# Patient Record
Sex: FEMALE | Race: WHITE | NOT HISPANIC OR LATINO
[De-identification: names, ages, dates, MRNs, and addresses within clinical notes are randomized per-mention and may not be internally consistent; named-entity substitution may affect disease eponyms.]

---

## 2022-08-10 ENCOUNTER — APPOINTMENT (OUTPATIENT)
Dept: PULMONOLOGY | Facility: CLINIC | Age: 62
End: 2022-08-10

## 2022-08-10 VITALS
BODY MASS INDEX: 35.85 KG/M2 | TEMPERATURE: 97 F | DIASTOLIC BLOOD PRESSURE: 80 MMHG | HEART RATE: 82 BPM | HEIGHT: 64 IN | OXYGEN SATURATION: 98 % | SYSTOLIC BLOOD PRESSURE: 132 MMHG | WEIGHT: 210 LBS

## 2022-08-10 DIAGNOSIS — J30.2 OTHER SEASONAL ALLERGIC RHINITIS: ICD-10-CM

## 2022-08-10 DIAGNOSIS — M19.90 UNSPECIFIED OSTEOARTHRITIS, UNSPECIFIED SITE: ICD-10-CM

## 2022-08-10 DIAGNOSIS — Z87.891 PERSONAL HISTORY OF NICOTINE DEPENDENCE: ICD-10-CM

## 2022-08-10 PROCEDURE — 99203 OFFICE O/P NEW LOW 30 MIN: CPT

## 2022-08-10 RX ORDER — LEVALBUTEROL HYDROCHLORIDE 1.25 MG/3ML
1.25 SOLUTION RESPIRATORY (INHALATION)
Refills: 0 | Status: COMPLETED | COMMUNITY
Start: 2022-07-07 | End: 2022-08-10

## 2022-08-11 PROBLEM — M19.90 OSTEOARTHRITIS: Status: RESOLVED | Noted: 2022-08-11 | Resolved: 2022-08-11

## 2022-08-11 PROBLEM — Z87.891 FORMER CIGARETTE SMOKER: Status: ACTIVE | Noted: 2022-08-11

## 2022-08-11 PROBLEM — J30.2 SEASONAL ALLERGIES: Status: RESOLVED | Noted: 2022-08-11 | Resolved: 2022-08-11

## 2022-08-11 NOTE — PHYSICAL EXAM
[No Acute Distress] : no acute distress [Normal Appearance] : normal appearance [No Neck Mass] : no neck mass [Normal Rate/Rhythm] : normal rate/rhythm [Normal S1, S2] : normal s1, s2 [No Murmurs] : no murmurs [No Resp Distress] : no resp distress [Clear to Auscultation Bilaterally] : clear to auscultation bilaterally [No Abnormalities] : no abnormalities [No Clubbing] : no clubbing [No Edema] : no edema [No Focal Deficits] : no focal deficits [Oriented x3] : oriented x3 [Normal Affect] : normal affect [TextBox_68] : No wheezes/rhonchi

## 2022-08-11 NOTE — DISCUSSION/SUMMARY
[FreeTextEntry1] : 62F remote smoker here to establish care for work-up of shortness of breath\par \par #Shortness of breath\par - Episodic, associated with allergic symptoms; most likely 2/2 asthma\par - Will check full PFTs with [facility]\par - Given Pt's frequent use of Fior, I have provided extensive education re: asthma management with maintenance inhaler regimen and potential need for biologics pending further work-up\par - To start daily symbicort (Pt still has inhaler at home) and educated on 2 puffs BID use with mouth-rinsing after each use\par \par Follow-up in 1 month for PFTs and symptom management

## 2022-08-11 NOTE — HISTORY OF PRESENT ILLNESS
[Former] : former [>= 20 pack years] : >= 20 pack years [Never] : never [TextBox_4] : 62F who presents to [facility]. She was previously following with Dr. Saab's office for presumed COPD vs asthma. She was scheduled to have PFTs for further work-up at his office but never had it done due to COVID. Last study done in 2003 which was normal per patient. \par \par In the past, Pt underwent elective surgeries for her knee/back. During the operations, she was told she woke up in the middle of anesthesia and had acute aspiration events causing bronchospasm. She was told she might have COPD and so was following with Dr. Saab for further work-up. He started her on symbicort which she took on PRN basis and has since stopped. In the past year, she has only been taking xopenex nebs and albuterol inhaler PRN. Additionally, she has had frequent exacerbations of her breathing especially during spring & christiano seasons requiring prednisone use. Most recently she had COVID 7/2022 but did not need hospitalization (she is vaccinated). Since then she has noted some nasal congestion persist. She previously had a chronic cough but reports her cough has gotten much better since recovering from COVID. She also reports seasonal hay fever symptoms such as watery eyes, itchy throat, runny nose. No history of childhood asthma, atopy, or nasal polyps.\par \par +tobacco: 2ppd x12 yrs; quit 40 yrs ago\par Brother - asthma\par  [TextBox_11] : 2 [TextBox_13] : 12 [YearQuit] : 1980

## 2022-08-11 NOTE — REVIEW OF SYSTEMS
[Eye Irritation] : eye irritation [Nasal Congestion] : nasal congestion [Postnasal Drip] : postnasal drip [Dyspnea] : dyspnea [Wheezing] : wheezing [SOB on Exertion] : sob on exertion [Hay Fever] : hay fever [Watery Eyes] : watery eyes [Fever] : no fever [Chills] : no chills [Poor Appetite] : no poor appetite [Sore Throat] : no sore throat [Dry Mouth] : no dry mouth [Mouth Ulcers] : no mouth ulcers [Cough] : no cough [Sputum] : no sputum [Pleuritic Pain] : no pleuritic pain [Hives] : no hives [GERD] : no gerd [Diarrhea] : no diarrhea [Food Intolerance] : no food intolerance [Nocturia] : no nocturia [Dysuria] : no dysuria [Myalgias] : no myalgias [Chronic Pain] : no chronic pain [Anemia] : no anemia [Headache] : no headache [Dizziness] : no dizziness

## 2022-09-08 ENCOUNTER — APPOINTMENT (OUTPATIENT)
Dept: PULMONOLOGY | Facility: CLINIC | Age: 62
End: 2022-09-08

## 2022-09-08 VITALS
SYSTOLIC BLOOD PRESSURE: 130 MMHG | DIASTOLIC BLOOD PRESSURE: 86 MMHG | HEIGHT: 64 IN | OXYGEN SATURATION: 98 % | BODY MASS INDEX: 35.34 KG/M2 | TEMPERATURE: 96.7 F | HEART RATE: 100 BPM | WEIGHT: 207 LBS

## 2022-09-08 DIAGNOSIS — Z91.89 OTHER SPECIFIED PERSONAL RISK FACTORS, NOT ELSEWHERE CLASSIFIED: ICD-10-CM

## 2022-09-08 DIAGNOSIS — R06.02 SHORTNESS OF BREATH: ICD-10-CM

## 2022-09-08 PROCEDURE — 99214 OFFICE O/P EST MOD 30 MIN: CPT

## 2022-09-08 NOTE — PHYSICAL EXAM
[No Acute Distress] : no acute distress [Low Lying Soft Palate] : low lying soft palate [Erythema] : erythema [III] : Mallampati Class: III [Normal Appearance] : normal appearance [No Neck Mass] : no neck mass [Normal Rate/Rhythm] : normal rate/rhythm [Normal S1, S2] : normal s1, s2 [No Murmurs] : no murmurs [No Resp Distress] : no resp distress [Clear to Auscultation Bilaterally] : clear to auscultation bilaterally [No Abnormalities] : no abnormalities [Benign] : benign [Normal Gait] : normal gait [No Clubbing] : no clubbing [No Cyanosis] : no cyanosis [No Edema] : no edema [FROM] : FROM [No Focal Deficits] : no focal deficits [Oriented x3] : oriented x3 [Normal Affect] : normal affect

## 2022-09-21 PROBLEM — R06.02 SHORTNESS OF BREATH: Status: ACTIVE | Noted: 2022-08-11

## 2022-09-21 NOTE — ASSESSMENT
[FreeTextEntry1] : 61 yo F w/ hx of Asthma presenting for follow up.\par \par 1) Asthma \par PFTs were discussed in detail. \par FVC: 79% predicted (2.24L) -> 88% post BD (260cc change)\par FEV1: 74% predicted (1.67L) -> 84% post BD (233cc change)\par Ratio: 74\par DLCO: 101% predicted\par NiOX: 9 (per patient, had to repeat multiple times to complete test)\par \par - Significant bronchodilator response with an obstructive pattern suggests asthma. She reportedly had a lot of difficulty with niox test, and it is unclear whether this is accurate for that reason\par - Due to her reversible airway obstruction, suggested to continue the symbicort in addition to her nasal sprays and singulair that was prescribed by ENT. Discussed in detail that medication interaction risks are likely lower than risks of complications of non-ICS treated asthma such as airway remodeling. Patient showed understanding and will start symbicort\par \par 2) LUL\par - Patient's complaints are mainly at night time including gasping for air, snoring, and nonrestorative sleep. With her body habitus and physical examination findings of a crowded upper airway, as well as her daytime somnolence, there is concern for sleep apnea contributing more to her night time symptoms than asthma. \par - Will send for a home sleep test as screening. The ramifications of obstructive sleep apnea and its potential therapeutic modalities were discussed with the patient. The dangers of drowsy driving were discussed with the patient. The patient was warned to avoid drowsy driving. \par \par The patient will followup after results of this sleep study are available. Sooner if needed. \par

## 2022-09-21 NOTE — HISTORY OF PRESENT ILLNESS
[TextBox_4] : 63 yo F w/ hx of Asthma presenting for follow up.\par \par Initially presented to [facility] care on 8/10/22. AT that time, she was sent from Dr. Saab's office (cardiology)for presumed COPD vs asthma. She was scheduled to have PFTs for further work-up at his office but never had it done due to COVID. Last study done in 2003 which was normal per patient. \par \par In the past, Pt underwent elective surgeries for her knee/back. During the operations, she was told she woke up in the middle of anesthesia and had acute aspiration events causing bronchospasm. She was told she might have COPD and so was following with Dr. Saab for further work-up. He started her on symbicort which she took on PRN basis and has since stopped. In the past year, she has only been taking xopenex nebs and albuterol inhaler PRN. Additionally, she has had frequent exacerbations of her breathing especially during spring & christiano seasons requiring prednisone use. Most recently she had COVID 7/2022 but did not need hospitalization (she is vaccinated). Since then she has noted some nasal congestion persist. She previously had a chronic cough but reports her cough has gotten much better since recovering from COVID. She also reports seasonal hay fever symptoms such as watery eyes, itchy throat, runny nose. No history of childhood asthma, atopy, or nasal polyps.\par \par 9/8/22:\par Patient saw ENT was started on fluticasone and azelastine. Due to concerns of multiple new medications, she did not start her symbicort that was prescribed. She also had her ear congestion treated and her nasal congestion has been better with the nasal sprays. She still takes xoponex at night but has not required any additional rescue inhalers during the daytime. Symptoms of cough primarily happen at night.\par Upon further history taking, she notes having daytime somnolence, gasping in the middle of the night, and chronic snoring. \par \par +tobacco: 2ppd x12 yrs; quit 40 yrs ago\par Brother - asthma

## 2022-10-13 PROBLEM — Z91.89 AT RISK FOR OBSTRUCTIVE SLEEP APNEA: Status: RESOLVED | Noted: 2022-09-08 | Resolved: 2022-10-13

## 2024-01-31 ENCOUNTER — APPOINTMENT (OUTPATIENT)
Dept: PULMONOLOGY | Facility: CLINIC | Age: 64
End: 2024-01-31
Payer: COMMERCIAL

## 2024-01-31 VITALS
HEIGHT: 64 IN | SYSTOLIC BLOOD PRESSURE: 146 MMHG | BODY MASS INDEX: 33.46 KG/M2 | OXYGEN SATURATION: 98 % | WEIGHT: 196 LBS | HEART RATE: 96 BPM | DIASTOLIC BLOOD PRESSURE: 88 MMHG

## 2024-01-31 PROCEDURE — 99213 OFFICE O/P EST LOW 20 MIN: CPT

## 2024-01-31 NOTE — HISTORY OF PRESENT ILLNESS
[TextBox_4] : 63 yo F w/ hx of Asthma presenting for follow up.  Initially presented to [facility] care on 8/10/22. AT that time, she was sent from Dr. Saab's office (cardiology)for presumed COPD vs asthma. She was scheduled to have PFTs for further work-up at his office but never had it done due to COVID. Last study done in 2003 which was normal per patient.   In the past, Pt underwent elective surgeries for her knee/back. During the operations, she was told she woke up in the middle of anesthesia and had acute aspiration events causing bronchospasm. She was told she might have COPD and so was following with Dr. Saab for further work-up. He started her on symbicort which she took on PRN basis and has since stopped. In the past year, she has only been taking xopenex nebs and albuterol inhaler PRN. Additionally, she has had frequent exacerbations of her breathing especially during spring & christiano seasons requiring prednisone use. Most recently she had COVID 7/2022 but did not need hospitalization (she is vaccinated). Since then she has noted some nasal congestion persist. She previously had a chronic cough but reports her cough has gotten much better since recovering from COVID. She also reports seasonal hay fever symptoms such as watery eyes, itchy throat, runny nose. No history of childhood asthma, atopy, or nasal polyps.  9/8/22: Patient saw ENT was started on fluticasone and azelastine. Due to concerns of multiple new medications, she did not start her symbicort that was prescribed. She also had her ear congestion treated and her nasal congestion has been better with the nasal sprays. She still takes xoponex at night but has not required any additional rescue inhalers during the daytime. Symptoms of cough primarily happen at night. Upon further history taking, she notes having daytime somnolence, gasping in the middle of the night, and chronic snoring.   +tobacco: 2ppd x12 yrs; quit 40 yrs ago Brother - asthma  1/2024 Ms. BEAULIEU returns today for follow-up. At her last visit with Dr. Bradley in 2022 she was ordered for HST which found moderate LUL. In 4/2023 she was in [place] and managing her home down there when she suddenly developed L sided HA and L eye drooping. She went to the local ER and had Neuro evaluation. She was told she had a small stroke in the posterior L side of her brain, and it was small like a grain of rice. At that time she had some visual changes, L hand weakness, and bilateral feet weakness so underwent PT with resolution of her motor deficits. She was started on 2 medications and took them for 2 months and then stopped for unclear reasons. She was instructed to follow up with her PCP upon return to [place]. She returned to [place] 11/2023 and is now seeing Dr. Hills ([facility]) as PCP. She was referred to cardiology as she was told she may have had an abnormal heart rhythm during her stroke work-up. She is awaiting appt with Dr. Storey. Recently she also went to [facility] ED for acute episode of palpitations with associated chest tightness. She was discharged on cozaar, atorvastatin, and medrol dose bella but she did not  any of the meds as she's nervous to take any medications. In terms of her asthma she notes she has not had any issues in the past year. She stopped taking symbicort and only using albuterol about 1-2 times per week.

## 2024-01-31 NOTE — PHYSICAL EXAM
[No Acute Distress] : no acute distress [Normal Rate/Rhythm] : normal rate/rhythm [Normal S1, S2] : normal s1, s2 [No Murmurs] : no murmurs [No Resp Distress] : no resp distress [Clear to Auscultation Bilaterally] : clear to auscultation bilaterally [No Abnormalities] : no abnormalities [No Clubbing] : no clubbing [No Cyanosis] : no cyanosis [No Edema] : no edema [FROM] : FROM [No Focal Deficits] : no focal deficits [Oriented x3] : oriented x3 [Normal Affect] : normal affect

## 2024-01-31 NOTE — DISCUSSION/SUMMARY
[FreeTextEntry1] : 63 F w/ hx of Asthma presenting for follow up  # Asthma  #moderate LUL  - Significant bronchodilator response with an obstructive pattern seen on prior PFTs suggests asthma - Symptoms appear to be mild and without frequent exacerbations or need for rescue inhaler - Will change previous symbicort to flovent given mild symptoms and advised Pt that though she has mild symptoms, it is still recommended to be on maintenance regimen to prevent future airway remodeling  - HST report reviewed with Pt. She has moderate LUL and was recommended to have in-lab PSG per report. I discussed LUL therapy including auto-PAP therapy. Pt with significant anxiety around using PAP device which I tried to allay. She inquires about Inspire device as she is hesitant to do PAP therapy. I have referred her to follow-up with Dr. Bradley to have further detailed discussion re: risks/benefits - We discussed her recent health concerns at length. She has a lot of anxiety surrounding her stroke diagnosis and all of the new specialists she is due to see. I stressed importance establishing care with Neurologist and Cardiologist at this time for post-stroke management. Upon review of her med list, it is unclear if she was found to have Afib or needs further evaluation for the etiology of her stroke last year. I also discussed importance of medication compliance in relation to post-stroke management

## 2024-02-28 ENCOUNTER — APPOINTMENT (OUTPATIENT)
Dept: PULMONOLOGY | Facility: CLINIC | Age: 64
End: 2024-02-28
Payer: COMMERCIAL

## 2024-02-28 VITALS
BODY MASS INDEX: 33.46 KG/M2 | WEIGHT: 196 LBS | OXYGEN SATURATION: 97 % | HEART RATE: 84 BPM | DIASTOLIC BLOOD PRESSURE: 86 MMHG | HEIGHT: 64 IN | SYSTOLIC BLOOD PRESSURE: 142 MMHG

## 2024-02-28 DIAGNOSIS — G47.33 OBSTRUCTIVE SLEEP APNEA (ADULT) (PEDIATRIC): ICD-10-CM

## 2024-02-28 DIAGNOSIS — J45.20 MILD INTERMITTENT ASTHMA, UNCOMPLICATED: ICD-10-CM

## 2024-02-28 PROCEDURE — 99213 OFFICE O/P EST LOW 20 MIN: CPT

## 2024-02-28 NOTE — ASSESSMENT
[FreeTextEntry1] : 63 F w/ hx of Asthma and moderate LUL presenting for follow up  - Advised to be adherent to Flovent every day and use albuterol prn. - Given her more recent neurological episode in [place], she should be on APAP. She is agreeable to treatment and will order from [facility]. She will call us if she does not hear from anybody in the next 1-2 weeks. After 1 month of use, will follow up with me to review therapy and compliance report. The ramifications of obstructive sleep apnea and its potential therapeutic modalities were discussed with the patient. The dangers of drowsy driving were discussed with the patient. The patient was warned to avoid drowsy driving. - Recommended follow up with ENT or PCP for positional vertigo (may be BPPV, Meniere's). She also has a history of almost passing out when laying flat and was scheduled to do a tilt table at some point. Advised her to get that done as well.

## 2024-02-28 NOTE — HISTORY OF PRESENT ILLNESS
[TextBox_4] : 61 yo F w/ hx of Asthma presenting for follow up.  Initially presented to [facility] on 8/10/22. AT that time, she was sent from Dr. Saab's office (cardiology)for presumed COPD vs asthma. She was scheduled to have PFTs for further work-up at his office but never had it done due to COVID. Last study done in 2003 which was normal per patient.   In the past, Pt underwent elective surgeries for her knee/back. During the operations, she was told she woke up in the middle of anesthesia and had acute aspiration events causing bronchospasm. She was told she might have COPD and so was following with Dr. Saab for further work-up. He started her on symbicort which she took on PRN basis and has since stopped. In the past year, she has only been taking xopenex nebs and albuterol inhaler PRN. Additionally, she has had frequent exacerbations of her breathing especially during spring & christiano seasons requiring prednisone use. Most recently she had COVID 7/2022 but did not need hospitalization (she is vaccinated). Since then she has noted some nasal congestion persist. She previously had a chronic cough but reports her cough has gotten much better since recovering from COVID. She also reports seasonal hay fever symptoms such as watery eyes, itchy throat, runny nose. No history of childhood asthma, atopy, or nasal polyps.  9/8/22: Patient saw ENT was started on fluticasone and azelastine. Due to concerns of multiple new medications, she did not start her symbicort that was prescribed. She also had her ear congestion treated and her nasal congestion has been better with the nasal sprays. She still takes xoponex at night but has not required any additional rescue inhalers during the daytime. Symptoms of cough primarily happen at night. Upon further history taking, she notes having daytime somnolence, gasping in the middle of the night, and chronic snoring.   1/2024 Ms. BEAULIEU returns today for follow-up. At her last visit with Dr. Bradley in 2022 she was ordered for HST which found moderate LUL. In 4/2023 she was in [place] and managing her home down there when she suddenly developed L sided HA and L eye drooping. She went to the local ER and had Neuro evaluation. She was told she had a small stroke in the posterior L side of her brain, and it was small like a grain of rice. At that time she had some visual changes, L hand weakness, and bilateral feet weakness so underwent PT with resolution of her motor deficits. She was started on 2 medications and took them for 2 months and then stopped for unclear reasons. She was instructed to follow up with her PCP upon return to [place]. She returned to [place] 11/2023 and is now seeing Dr. Hills ([facility]) as PCP. She was referred to cardiology as she was told she may have had an abnormal heart rhythm during her stroke work-up. She is awaiting appt with Dr. Storey. Recently she also went to [facility] ED for acute episode of palpitations with associated chest tightness. She was discharged on cozaar, atorvastatin, and medrol dose bella but she did not  any of the meds as she's nervous to take any medications. In terms of her asthma she notes she has not had any issues in the past year. She stopped taking symbicort and only using albuterol about 1-2 times per week.  2/28/24 Patient presents for follow up. Sleep quality has been the same with not much daytime sleepiness but frequent awakenings at night. Respiratory symptoms during daytime are well controlled still with minimal albuterol use but nonadherent to flovent every day.  [ESS] : 8

## 2024-03-26 ENCOUNTER — APPOINTMENT (OUTPATIENT)
Dept: HEART AND VASCULAR | Facility: CLINIC | Age: 64
End: 2024-03-26
Payer: COMMERCIAL

## 2024-03-26 ENCOUNTER — NON-APPOINTMENT (OUTPATIENT)
Age: 64
End: 2024-03-26

## 2024-03-26 VITALS
BODY MASS INDEX: 32.61 KG/M2 | HEART RATE: 74 BPM | OXYGEN SATURATION: 97 % | TEMPERATURE: 97.6 F | SYSTOLIC BLOOD PRESSURE: 112 MMHG | DIASTOLIC BLOOD PRESSURE: 78 MMHG | WEIGHT: 191 LBS | RESPIRATION RATE: 16 BRPM | HEIGHT: 64 IN

## 2024-03-26 DIAGNOSIS — Z87.891 PERSONAL HISTORY OF NICOTINE DEPENDENCE: ICD-10-CM

## 2024-03-26 DIAGNOSIS — Z82.49 FAMILY HISTORY OF ISCHEMIC HEART DISEASE AND OTHER DISEASES OF THE CIRCULATORY SYSTEM: ICD-10-CM

## 2024-03-26 DIAGNOSIS — Z00.00 ENCOUNTER FOR GENERAL ADULT MEDICAL EXAMINATION W/OUT ABNORMAL FINDINGS: ICD-10-CM

## 2024-03-26 DIAGNOSIS — Z78.9 OTHER SPECIFIED HEALTH STATUS: ICD-10-CM

## 2024-03-26 DIAGNOSIS — Z80.9 FAMILY HISTORY OF MALIGNANT NEOPLASM, UNSPECIFIED: ICD-10-CM

## 2024-03-26 DIAGNOSIS — Z83.438 FAMILY HISTORY OF OTHER DISORDER OF LIPOPROTEIN METABOLISM AND OTHER LIPIDEMIA: ICD-10-CM

## 2024-03-26 DIAGNOSIS — Z86.79 PERSONAL HISTORY OF OTHER DISEASES OF THE CIRCULATORY SYSTEM: ICD-10-CM

## 2024-03-26 DIAGNOSIS — Z72.3 LACK OF PHYSICAL EXERCISE: ICD-10-CM

## 2024-03-26 PROCEDURE — 99204 OFFICE O/P NEW MOD 45 MIN: CPT | Mod: 25

## 2024-03-26 PROCEDURE — 93000 ELECTROCARDIOGRAM COMPLETE: CPT | Mod: 59

## 2024-03-26 PROCEDURE — 93246 EXT ECG>7D<15D RECORDING: CPT

## 2024-03-26 RX ORDER — FLUTICASONE PROPIONATE 110 UG/1
110 AEROSOL, METERED RESPIRATORY (INHALATION)
Qty: 1 | Refills: 5 | Status: DISCONTINUED | COMMUNITY
Start: 2024-01-31 | End: 2024-03-26

## 2024-03-26 RX ORDER — BUDESONIDE 90 UG/1
90 AEROSOL, POWDER RESPIRATORY (INHALATION)
Qty: 1 | Refills: 5 | Status: DISCONTINUED | COMMUNITY
Start: 2024-01-31 | End: 2024-03-26

## 2024-03-26 NOTE — REASON FOR VISIT
[FreeTextEntry1] : 63 year old F with history of COPD, LUL (to be started on CPAP machine), history of TIA/CVA last 2023 with improvement of deficits with PT, not on any medications here for cardiac evaluation. Initially on BP medications during CVA incident, however stopped after one month. No longer needing medications. Sees ENT for Vertigo. Gets occasional chest pain and jaw pain on left. Chest discomfort sensation "elephant sitting on chest". Gets occasional left temple pain. Chest pressure has been present x 2 years, no relation to exertion. Has been to ED for a few times in [place]. Was noted to have elevated BP. Occasional palpitations. Expresses concern with word finding.   Family history: Parents with heart disease. Mother  at age 44 from massive MI and atrial fibrillation. Father with enlarged heart but thought to be secondary to alcoholism.  secondary to cancer. Had history of PPM.   EKG today: NSR at 75 bpm with NSST in leads III, V3.  Labs 2024: ESR 16; trop negative, GFR >90; CMP WNL; CBC WNL EKG 2024: NSR at 71 bpm with NSST in inferior leads.  CXR 2024: Normal

## 2024-03-26 NOTE — PHYSICAL EXAM
[Well Developed] : well developed [Well Nourished] : well nourished [No Acute Distress] : no acute distress [Normal Conjunctiva] : normal conjunctiva [Normal Venous Pressure] : normal venous pressure [No Carotid Bruit] : no carotid bruit [Normal S1, S2] : normal S1, S2 [No Murmur] : no murmur [No Rub] : no rub [No Gallop] : no gallop [Clear Lung Fields] : clear lung fields [Good Air Entry] : good air entry [No Respiratory Distress] : no respiratory distress  [Soft] : abdomen soft [Non Tender] : non-tender [No Masses/organomegaly] : no masses/organomegaly [Normal Gait] : normal gait [Normal Bowel Sounds] : normal bowel sounds [No Edema] : no edema [No Clubbing] : no clubbing [No Cyanosis] : no cyanosis [No Varicosities] : no varicosities [No Rash] : no rash [Moves all extremities] : moves all extremities [No Skin Lesions] : no skin lesions [No Focal Deficits] : no focal deficits [Normal Speech] : normal speech [Alert and Oriented] : alert and oriented [Normal memory] : normal memory

## 2024-03-26 NOTE — DISCUSSION/SUMMARY
[Possible Cardiac Ischemia (Intermd Prob)] : possible cardiac ischemia (intermediate probability) [Stress Echocardiogram] : stress echocardiogram [Echocardiogram] : echocardiogram [EKG obtained to assist in diagnosis and management of assessed problem(s)] : EKG obtained to assist in diagnosis and management of assessed problem(s) [Holter Monitor] : a Holter monitor [de-identified] : Occasional BP spikes  [de-identified] : Doppler carotid [de-identified] : history of TIA/CVA [FreeTextEntry4] : Labs today. Fasting. Neurology consult given TIA/CVA  and word finding difficulty [FreeTextEntry3] : after testing completed

## 2024-03-27 LAB
ALBUMIN SERPL ELPH-MCNC: 4.7 G/DL
ALP BLD-CCNC: 73 U/L
ALT SERPL-CCNC: 14 U/L
ANION GAP SERPL CALC-SCNC: 14 MMOL/L
AST SERPL-CCNC: 21 U/L
BASOPHILS # BLD AUTO: 0.04 K/UL
BASOPHILS NFR BLD AUTO: 0.4 %
BILIRUB SERPL-MCNC: 0.6 MG/DL
BUN SERPL-MCNC: 16 MG/DL
CALCIUM SERPL-MCNC: 10.1 MG/DL
CHLORIDE SERPL-SCNC: 100 MMOL/L
CHOLEST SERPL-MCNC: 224 MG/DL
CO2 SERPL-SCNC: 24 MMOL/L
CREAT SERPL-MCNC: 0.51 MG/DL
EGFR: 104 ML/MIN/1.73M2
EOSINOPHIL # BLD AUTO: 0.15 K/UL
EOSINOPHIL NFR BLD AUTO: 1.5 %
ESTIMATED AVERAGE GLUCOSE: 114 MG/DL
GLUCOSE SERPL-MCNC: 107 MG/DL
HBA1C MFR BLD HPLC: 5.6 %
HCT VFR BLD CALC: 46.2 %
HDLC SERPL-MCNC: 45 MG/DL
HGB BLD-MCNC: 14.3 G/DL
IMM GRANULOCYTES NFR BLD AUTO: 0.2 %
LDLC SERPL CALC-MCNC: 157 MG/DL
LYMPHOCYTES # BLD AUTO: 1.55 K/UL
LYMPHOCYTES NFR BLD AUTO: 15.5 %
MAN DIFF?: NORMAL
MCHC RBC-ENTMCNC: 28.5 PG
MCHC RBC-ENTMCNC: 31 GM/DL
MCV RBC AUTO: 92 FL
MONOCYTES # BLD AUTO: 0.6 K/UL
MONOCYTES NFR BLD AUTO: 6 %
NEUTROPHILS # BLD AUTO: 7.67 K/UL
NEUTROPHILS NFR BLD AUTO: 76.4 %
NONHDLC SERPL-MCNC: 178 MG/DL
PLATELET # BLD AUTO: 261 K/UL
POTASSIUM SERPL-SCNC: 4.5 MMOL/L
PROT SERPL-MCNC: 8.4 G/DL
RBC # BLD: 5.02 M/UL
RBC # FLD: 14.4 %
SODIUM SERPL-SCNC: 139 MMOL/L
T4 SERPL-MCNC: 7.8 UG/DL
TRIGL SERPL-MCNC: 118 MG/DL
TSH SERPL-ACNC: 1.05 UIU/ML
WBC # FLD AUTO: 10.03 K/UL

## 2024-03-28 ENCOUNTER — NON-APPOINTMENT (OUTPATIENT)
Age: 64
End: 2024-03-28

## 2024-03-29 ENCOUNTER — APPOINTMENT (OUTPATIENT)
Dept: HEART AND VASCULAR | Facility: CLINIC | Age: 64
End: 2024-03-29
Payer: COMMERCIAL

## 2024-03-29 PROCEDURE — 93246 EXT ECG>7D<15D RECORDING: CPT

## 2024-04-09 ENCOUNTER — APPOINTMENT (OUTPATIENT)
Dept: NEUROLOGY | Facility: CLINIC | Age: 64
End: 2024-04-09
Payer: COMMERCIAL

## 2024-04-09 VITALS
BODY MASS INDEX: 33.46 KG/M2 | OXYGEN SATURATION: 98 % | HEART RATE: 81 BPM | WEIGHT: 196 LBS | HEIGHT: 64 IN | SYSTOLIC BLOOD PRESSURE: 130 MMHG | DIASTOLIC BLOOD PRESSURE: 85 MMHG

## 2024-04-09 DIAGNOSIS — R41.3 OTHER AMNESIA: ICD-10-CM

## 2024-04-09 DIAGNOSIS — Z86.73 PERSONAL HISTORY OF TRANSIENT ISCHEMIC ATTACK (TIA), AND CEREBRAL INFARCTION W/OUT RESIDUAL DEFICITS: ICD-10-CM

## 2024-04-09 DIAGNOSIS — F41.9 ANXIETY DISORDER, UNSPECIFIED: ICD-10-CM

## 2024-04-09 DIAGNOSIS — F32.A ANXIETY DISORDER, UNSPECIFIED: ICD-10-CM

## 2024-04-09 PROCEDURE — G2211 COMPLEX E/M VISIT ADD ON: CPT

## 2024-04-09 PROCEDURE — 99205 OFFICE O/P NEW HI 60 MIN: CPT

## 2024-04-09 RX ORDER — ASPIRIN 81 MG
81 TABLET, DELAYED RELEASE (ENTERIC COATED) ORAL
Refills: 0 | Status: ACTIVE | COMMUNITY

## 2024-04-09 NOTE — ASSESSMENT
----- Message from Dulce Mccarty sent at 9/7/2017 11:19 AM CDT -----  Contact: self  Needs refill on  Metoprolol 25 mg and Lisinopril   Please call back at [phone] (home)     [facility] [id number] [address] - [address]  [place] [address]  Phone: [phone] Fax: [phone]       [FreeTextEntry1] : 53 yr old female with extensive family history for cardiovascular disease , HLD had stroke  Would advise the following targets to be reached for optimal stroke prevention  1) compliance with antiplatelets 2) Adjust BP medications and monitor blood pressure to assure pressure goal < 130/80 mm Hg maintained 3) considering her extensive family history of cardiovascular disease and markedly elevated LDL we should aim for LDL less than 70 mg/dl. She just started Lipitor 40 mg and I advised her if in two weeks she has no side effects she should increase the dose to 80 mg and request a refill. I suspect statin alone may not be enough to achieve this goal and may need PCSK9 inhibitor in the near future.  4) assure no large vessel occlusion so will order MRA brain and MRA neck  5) Moderate intensity exercise at minimum 3 times per week 6) Depression and anxiety is a concern. Will refer to Psychiatry and suggest starting antidepressant 7) Follow up in 6-8 weeks

## 2024-04-09 NOTE — CONSULT LETTER
[Dear  ___] : Dear  [unfilled], [( Thank you for referring [unfilled] for consultation for _____ )] : Thank you for referring [unfilled] for consultation for [unfilled] [Please see my note below.] : Please see my note below. [Sincerely,] : Sincerely, [FreeTextEntry3] : Eden Jacobs M.D. Stroke Director, [facility] Director of Neurology, [facility] Stroke Director [facility]

## 2024-04-09 NOTE — HISTORY OF PRESENT ILLNESS
[FreeTextEntry1] : Patient presents for initial visit to establish care for secondary stroke preventino  She had left side weakness with ringin in her ears for several days and she had to work with PT  She was admitted to hospital in [place] on March 31, 2024 She was started on aspirin and cholesterol medication after the stroke She feels she has some weak left hand  , recent memory impairment, word finding difficulty She will forget what she is cooking, she has misplaced keys multipe times, lost wallet, could not find eggs this morning  Her daughter thinks she has some mild cognitive issues before the stroke but it is definitely much worse since the stroke She has a black floater in left eye and black line in her right eye

## 2024-04-09 NOTE — PHYSICAL EXAM
[FreeTextEntry1] : Mental status: Orientation: Alert , oriented to month, day of week, year, location                                                                                                                                    Speech is fluent , able to name objects but struggles with word finding, repeat a sentence and write a sentence                                                                                                                                       Memory: Short term tested by registering  3 objects and recalled 2/3 in 5 min; Long term memory intact based on correct recall of past events and demographic details.                                                                                                                                                                                                     Concentration: able to do serial 7 calculation  5/5 and spell world backwards                                                                                                      Comprehension : able follow 3 step requests                                                                                                                                                                                                                            Apraxia absent                                                                                                          Neglect is absent                                                                                                                                                                                                                                                                                  Agnosia is absent                                                                                                                                          MMSE 2930 Mood : Anxious and tearful during the visit Cranial nerves: CN I deferred. CN  II VFF; fundus exam benign; III, IV, VI: PERRLA, EOM IV: Facial sensation normal B/L to touch, pinprick and temperatureVII:Facial strength normal B/L. VIII: Gross hearing intact IX, X: palate midline and elevates symmetrically XI: Trapezius normal strength, XII: Tongue midline without atrophy or fasciculation Motor: Muscle tone no rigidity or resistance in all 4 extremities. No atrophy or fasciculation. Muscle strength: arms and legs, proximal and distal flexors and extensors are normal 5/5 . No UE drift. Sensory: intact to pinprick, temperature  sensation to vibration and cold.  Reflexes: all present, normal, and symmetrical 2+  Coordination: finger to nose: normal. Heel to shin: normal Gait: steady normal based ; Romberg test negative

## 2024-04-21 ENCOUNTER — RESULT REVIEW (OUTPATIENT)
Age: 64
End: 2024-04-21

## 2024-05-01 ENCOUNTER — APPOINTMENT (OUTPATIENT)
Dept: HEART AND VASCULAR | Facility: CLINIC | Age: 64
End: 2024-05-01
Payer: COMMERCIAL

## 2024-05-01 VITALS
WEIGHT: 194 LBS | OXYGEN SATURATION: 98 % | SYSTOLIC BLOOD PRESSURE: 124 MMHG | HEIGHT: 64 IN | BODY MASS INDEX: 33.12 KG/M2 | HEART RATE: 81 BPM | DIASTOLIC BLOOD PRESSURE: 70 MMHG

## 2024-05-01 PROCEDURE — 93320 DOPPLER ECHO COMPLETE: CPT

## 2024-05-01 PROCEDURE — 93351 STRESS TTE COMPLETE: CPT

## 2024-05-01 PROCEDURE — 93880 EXTRACRANIAL BILAT STUDY: CPT

## 2024-05-01 PROCEDURE — 93325 DOPPLER ECHO COLOR FLOW MAPG: CPT

## 2024-05-14 ENCOUNTER — APPOINTMENT (OUTPATIENT)
Dept: HEART AND VASCULAR | Facility: CLINIC | Age: 64
End: 2024-05-14
Payer: COMMERCIAL

## 2024-05-14 VITALS
WEIGHT: 197 LBS | DIASTOLIC BLOOD PRESSURE: 80 MMHG | SYSTOLIC BLOOD PRESSURE: 120 MMHG | TEMPERATURE: 98.2 F | HEART RATE: 76 BPM | HEIGHT: 64 IN | RESPIRATION RATE: 16 BRPM | OXYGEN SATURATION: 98 % | BODY MASS INDEX: 33.63 KG/M2

## 2024-05-14 DIAGNOSIS — I47.19 OTHER SUPRAVENTRICULAR TACHYCARDIA: ICD-10-CM

## 2024-05-14 DIAGNOSIS — R07.89 OTHER CHEST PAIN: ICD-10-CM

## 2024-05-14 PROCEDURE — 99214 OFFICE O/P EST MOD 30 MIN: CPT

## 2024-05-14 RX ORDER — METOPROLOL SUCCINATE 25 MG/1
25 TABLET, EXTENDED RELEASE ORAL DAILY
Qty: 90 | Refills: 3 | Status: ACTIVE | COMMUNITY
Start: 2024-05-14 | End: 1900-01-01

## 2024-05-14 RX ORDER — ATORVASTATIN CALCIUM 80 MG/1
80 TABLET, FILM COATED ORAL
Qty: 90 | Refills: 3 | Status: ACTIVE | COMMUNITY
Start: 2024-04-03 | End: 1900-01-01

## 2024-05-14 NOTE — REASON FOR VISIT
[FreeTextEntry1] : 63 year old F with history of COPD, LUL (to be started on CPAP machine), history of TIA/CVA last 2023 with improvement of deficits with PT here for followup. Daily palpitations. Gets chest pressure when palpitations occur.   Family history: Parents with heart disease. Mother  at age 44 from massive MI and atrial fibrillation. Father with enlarged heart but thought to be secondary to alcoholism.  secondary to cancer. Had history of PPM.   MRI Brain/Neck 2024: No ischemia, hemorrhage, mass effect. No stenosis of vessels. No aneurysm. No occlusions.  Carotid U/S May 1 2024: No significant disease.  EXSE May 1 2024:  protocol 6:40 min. No EKG changes and No WMA on post exercise.  Echo May 1 2024: Normal biventricular function. Mild tricuspid valve regurgitation. Trace Mitral regurgitation. RVSP 32 mm HG Holter 2 weeks Atrial tachycardia 17 episodes ; longest 10 beats - during day - Occasional PVCS.   EKG 2024: NSR at 75 bpm with NSST in leads III, V3.   Labs: CBC WNL; CMP WNL; TFTs WNL; ; HDL 45; Total chol 224; ; A1C 5.6 Labs 2024: ESR 16; trop negative, GFR >90; CMP WNL; CBC WNL EKG 2024: NSR at 71 bpm with NSST in inferior leads.  CXR 2024: Normal

## 2024-05-14 NOTE — DISCUSSION/SUMMARY
[Echocardiogram] : echocardiogram [Stress Echocardiogram] : stress echocardiogram [Holter Monitor] : a Holter monitor [Unlikely Cardiac Ischemia (Low Prob.)] : chest pain unlikely to represent cardiac ischemia (low probability) [Hyperlipidemia] : hyperlipidemia [Diet Modification] : diet modification [___ Month(s)] : in [unfilled] month(s) [de-identified] : Agree with increasing Atorvastatin 80 mg PO daily.  [de-identified] : Occasional BP spikes - normal range at home [de-identified] : Atrial tachycardia and Sinus tachycardia [de-identified] : Start Toprol XL 25 mg PO daily [de-identified] : history of TIA/CVA [de-identified] : Doppler carotid [FreeTextEntry4] : Check labs in 4 weeks.

## 2024-06-04 ENCOUNTER — APPOINTMENT (OUTPATIENT)
Dept: HEART AND VASCULAR | Facility: CLINIC | Age: 64
End: 2024-06-04
Payer: COMMERCIAL

## 2024-06-04 DIAGNOSIS — E78.5 HYPERLIPIDEMIA, UNSPECIFIED: ICD-10-CM

## 2024-06-04 PROCEDURE — 36415 COLL VENOUS BLD VENIPUNCTURE: CPT

## 2024-06-05 ENCOUNTER — NON-APPOINTMENT (OUTPATIENT)
Age: 64
End: 2024-06-05

## 2024-06-05 LAB
ALBUMIN SERPL ELPH-MCNC: 4.4 G/DL
ALP BLD-CCNC: 78 U/L
ALT SERPL-CCNC: 25 U/L
ANION GAP SERPL CALC-SCNC: 18 MMOL/L
AST SERPL-CCNC: 26 U/L
BILIRUB SERPL-MCNC: 0.5 MG/DL
BUN SERPL-MCNC: 13 MG/DL
CALCIUM SERPL-MCNC: 9.4 MG/DL
CHLORIDE SERPL-SCNC: 102 MMOL/L
CHOLEST SERPL-MCNC: 107 MG/DL
CO2 SERPL-SCNC: 20 MMOL/L
CREAT SERPL-MCNC: 0.54 MG/DL
EGFR: 103 ML/MIN/1.73M2
GLUCOSE SERPL-MCNC: 106 MG/DL
HCYS SERPL-MCNC: 10.6 UMOL/L
HDLC SERPL-MCNC: 39 MG/DL
LDLC SERPL CALC-MCNC: 45 MG/DL
NONHDLC SERPL-MCNC: 68 MG/DL
POTASSIUM SERPL-SCNC: 4.3 MMOL/L
PROT SERPL-MCNC: 7.9 G/DL
SODIUM SERPL-SCNC: 141 MMOL/L
TRIGL SERPL-MCNC: 132 MG/DL
TSH SERPL-ACNC: 2.97 UIU/ML
VIT B12 SERPL-MCNC: 642 PG/ML

## 2024-06-10 LAB — METHYLMALONATE SERPL-SCNC: 152 NMOL/L

## 2024-07-17 ENCOUNTER — RESULT REVIEW (OUTPATIENT)
Age: 64
End: 2024-07-17

## 2024-07-19 ENCOUNTER — NON-APPOINTMENT (OUTPATIENT)
Age: 64
End: 2024-07-19

## 2024-09-04 ENCOUNTER — APPOINTMENT (OUTPATIENT)
Dept: HEART AND VASCULAR | Facility: CLINIC | Age: 64
End: 2024-09-04

## 2024-09-04 ENCOUNTER — NON-APPOINTMENT (OUTPATIENT)
Age: 64
End: 2024-09-04

## 2024-09-04 VITALS
SYSTOLIC BLOOD PRESSURE: 106 MMHG | OXYGEN SATURATION: 98 % | DIASTOLIC BLOOD PRESSURE: 72 MMHG | HEIGHT: 64 IN | RESPIRATION RATE: 16 BRPM | BODY MASS INDEX: 32.44 KG/M2 | HEART RATE: 80 BPM | WEIGHT: 190 LBS

## 2024-09-04 DIAGNOSIS — I87.2 VENOUS INSUFFICIENCY (CHRONIC) (PERIPHERAL): ICD-10-CM

## 2024-09-04 PROCEDURE — 99204 OFFICE O/P NEW MOD 45 MIN: CPT

## 2024-09-04 RX ORDER — RANOLAZINE 500 MG/1
500 TABLET, EXTENDED RELEASE ORAL
Qty: 60 | Refills: 6 | Status: ACTIVE | COMMUNITY
Start: 2024-09-04 | End: 1900-01-01

## 2024-09-05 LAB
ALBUMIN SERPL ELPH-MCNC: 4.4 G/DL
ALP BLD-CCNC: 80 U/L
ALT SERPL-CCNC: 20 U/L
ANION GAP SERPL CALC-SCNC: 14 MMOL/L
AST SERPL-CCNC: 21 U/L
BASOPHILS # BLD AUTO: 0.03 K/UL
BASOPHILS NFR BLD AUTO: 0.3 %
BILIRUB SERPL-MCNC: 0.6 MG/DL
BUN SERPL-MCNC: 13 MG/DL
CALCIUM SERPL-MCNC: 9.3 MG/DL
CHLORIDE SERPL-SCNC: 102 MMOL/L
CHOLEST SERPL-MCNC: 94 MG/DL
CO2 SERPL-SCNC: 25 MMOL/L
CREAT SERPL-MCNC: 0.49 MG/DL
CRP SERPL HS-MCNC: 5.49 MG/L
EGFR: 105 ML/MIN/1.73M2
EOSINOPHIL # BLD AUTO: 0.14 K/UL
EOSINOPHIL NFR BLD AUTO: 1.4 %
ESTIMATED AVERAGE GLUCOSE: 117 MG/DL
GLUCOSE SERPL-MCNC: 106 MG/DL
HBA1C MFR BLD HPLC: 5.7 %
HCT VFR BLD CALC: 42.7 %
HDLC SERPL-MCNC: 34 MG/DL
HGB BLD-MCNC: 13.5 G/DL
IMM GRANULOCYTES NFR BLD AUTO: 0.4 %
LDLC SERPL CALC-MCNC: 42 MG/DL
LYMPHOCYTES # BLD AUTO: 1.44 K/UL
LYMPHOCYTES NFR BLD AUTO: 14.4 %
MAN DIFF?: NORMAL
MCHC RBC-ENTMCNC: 29.1 PG
MCHC RBC-ENTMCNC: 31.6 GM/DL
MCV RBC AUTO: 92 FL
MONOCYTES # BLD AUTO: 0.6 K/UL
MONOCYTES NFR BLD AUTO: 6 %
NEUTROPHILS # BLD AUTO: 7.72 K/UL
NEUTROPHILS NFR BLD AUTO: 77.5 %
NONHDLC SERPL-MCNC: 60 MG/DL
NT-PROBNP SERPL-MCNC: 175 PG/ML
PLATELET # BLD AUTO: 281 K/UL
POTASSIUM SERPL-SCNC: 4.4 MMOL/L
PROT SERPL-MCNC: 7.2 G/DL
RBC # BLD: 4.64 M/UL
RBC # FLD: 13.9 %
SODIUM SERPL-SCNC: 141 MMOL/L
TRIGL SERPL-MCNC: 93 MG/DL
TSH SERPL-ACNC: 0.83 UIU/ML
WBC # FLD AUTO: 9.97 K/UL

## 2024-09-10 ENCOUNTER — NON-APPOINTMENT (OUTPATIENT)
Age: 64
End: 2024-09-10

## 2024-09-15 NOTE — ASSESSMENT
[FreeTextEntry1] : 64 year old F with history of COPD, LUL, history of TIA/CVA last April 2023 (headaches and facial droop left and left arm/leg was weak, now improved), former smoker (2.5 ppd for 10 years quit >40 yrs ago) here for follow up.   #ASCVD - non-obs CAD on CCTA with elevated calcium score, CCS III; elevated cardiac risk --will obtain CT-FFR to evaluate LAD given persistent exertional symptoms --trial of ranolazine in addition to beta blocker --if persistent symptoms, to consider invasive angiogram - continue ASA - continue statin - continued risk factor modification; CV risk factors discussed --blood work pending today  # Hyperlipidemia -  Well controlled, Aim for a LDL of <70 - Continue current prescribed medications - Low fat low cholesterol diet - Heart healthy diet emphasized - Exercise as tolerated   #Leg cramping, ?venous insufficiency  --compression socks prescribed; used strongly encouraged --venous reflux study pending --leg elevation and exercise as tolerated --OTC topical venous therapies recommended for cramps/symptoms  Will f/u after above testing.

## 2024-09-15 NOTE — HISTORY OF PRESENT ILLNESS
[FreeTextEntry1] : 64 year old F with history of COPD, LUL, history of TIA/CVA last 2023 (headaches and facial droop left and left arm/leg was weak, now improved), former smoker (2.5 ppd for 10 years quit >40 yrs ago) here for follow up.  Previously followed by Dr. Storey, last seen 2024; initially underwent eval for palpitations and chest pressure. Here for follow up care after recent testing. Daily palpitations. Gets chest pressure when palpitations occur. Continues to have chest discomfort; can walk for about 1 mile - stops care home due to dyspnea - now feels a heaviness in the chest - only with exertion. resolves with rest. Limited exercise capacity 2/2 knee pain. had similar symptoms 2 years ago for similar symptoms - had a CT at the time that was reportedly normal. Reports beta blocker pre-CT made her feel like she had a "rush" in her legs. Endorses zaida-horse cramping at night; feels like restless leg pain.  Family history: Parents with heart disease. Mother  at age 44 from massive MI and atrial fibrillation. Father with enlarged heart but thought to be secondary to alcoholism.  secondary to cancer. Had history of PPM.  Cardiac Workup: EKG 2024: NSR at 75 bpm with NSST in leads III, V3. MRI Brain/Neck 2024: No ischemia, hemorrhage, mass effect. No stenosis of vessels. No aneurysm. No occlusions. Carotid U/S May 1 2024: No significant disease. EXSE May 1 2024:  protocol 6:40 min. No EKG changes and No WMA on post exercise. Echo May 1 2024: Normal biventricular function. Mild tricuspid valve regurgitation. Trace Mitral regurgitation. RVSP 32 mm HG Holter 2 weeks Atrial tachycardia 17 episodes ; longest 10 beats - during day - Occasional PVCS. Labs: CBC WNL; CMP WNL; TFTs WNL; ; HDL 45; Total chol 224; ; A1C 5.6 CCTA 2024:   AU, mild stenosis in the prox-mid LAD, minimal LCX disease. RCA WNL

## 2024-09-15 NOTE — HISTORY OF PRESENT ILLNESS
[FreeTextEntry1] : 64 year old F with history of COPD, LUL, history of TIA/CVA last 2023 (headaches and facial droop left and left arm/leg was weak, now improved), former smoker (2.5 ppd for 10 years quit >40 yrs ago) here for follow up.  Previously followed by Dr. Storey, last seen 2024; initially underwent eval for palpitations and chest pressure. Here for follow up care after recent testing. Daily palpitations. Gets chest pressure when palpitations occur. Continues to have chest discomfort; can walk for about 1 mile - stops long term due to dyspnea - now feels a heaviness in the chest - only with exertion. resolves with rest. Limited exercise capacity 2/2 knee pain. had similar symptoms 2 years ago for similar symptoms - had a CT at the time that was reportedly normal. Reports beta blocker pre-CT made her feel like she had a "rush" in her legs. Endorses zaida-horse cramping at night; feels like restless leg pain.  Family history: Parents with heart disease. Mother  at age 44 from massive MI and atrial fibrillation. Father with enlarged heart but thought to be secondary to alcoholism.  secondary to cancer. Had history of PPM.  Cardiac Workup: EKG 2024: NSR at 75 bpm with NSST in leads III, V3. MRI Brain/Neck 2024: No ischemia, hemorrhage, mass effect. No stenosis of vessels. No aneurysm. No occlusions. Carotid U/S May 1 2024: No significant disease. EXSE May 1 2024:  protocol 6:40 min. No EKG changes and No WMA on post exercise. Echo May 1 2024: Normal biventricular function. Mild tricuspid valve regurgitation. Trace Mitral regurgitation. RVSP 32 mm HG Holter 2 weeks Atrial tachycardia 17 episodes ; longest 10 beats - during day - Occasional PVCS. Labs: CBC WNL; CMP WNL; TFTs WNL; ; HDL 45; Total chol 224; ; A1C 5.6 CCTA 2024:   AU, mild stenosis in the prox-mid LAD, minimal LCX disease. RCA WNL

## 2024-09-27 ENCOUNTER — RESULT REVIEW (OUTPATIENT)
Age: 64
End: 2024-09-27

## 2024-10-16 ENCOUNTER — APPOINTMENT (OUTPATIENT)
Dept: HEART AND VASCULAR | Facility: CLINIC | Age: 64
End: 2024-10-16
Payer: COMMERCIAL

## 2024-10-16 PROCEDURE — 93970 EXTREMITY STUDY: CPT

## 2024-10-28 ENCOUNTER — NON-APPOINTMENT (OUTPATIENT)
Age: 64
End: 2024-10-28

## 2024-10-28 ENCOUNTER — APPOINTMENT (OUTPATIENT)
Dept: HEART AND VASCULAR | Facility: CLINIC | Age: 64
End: 2024-10-28
Payer: COMMERCIAL

## 2024-10-28 VITALS
RESPIRATION RATE: 16 BRPM | HEIGHT: 64 IN | DIASTOLIC BLOOD PRESSURE: 80 MMHG | SYSTOLIC BLOOD PRESSURE: 119 MMHG | BODY MASS INDEX: 33.63 KG/M2 | HEART RATE: 88 BPM | OXYGEN SATURATION: 96 % | WEIGHT: 197 LBS

## 2024-10-28 PROCEDURE — 99213 OFFICE O/P EST LOW 20 MIN: CPT
